# Patient Record
Sex: MALE | Race: WHITE | Employment: FULL TIME | URBAN - METROPOLITAN AREA
[De-identification: names, ages, dates, MRNs, and addresses within clinical notes are randomized per-mention and may not be internally consistent; named-entity substitution may affect disease eponyms.]

---

## 2018-08-06 ENCOUNTER — TRANSFERRED RECORDS (OUTPATIENT)
Dept: HEALTH INFORMATION MANAGEMENT | Facility: CLINIC | Age: 58
End: 2018-08-06

## 2018-09-11 ENCOUNTER — TRANSFERRED RECORDS (OUTPATIENT)
Dept: HEALTH INFORMATION MANAGEMENT | Facility: CLINIC | Age: 58
End: 2018-09-11

## 2018-09-11 LAB
ALT SERPL-CCNC: 32 IU/L (ref 0–44)
AST SERPL-CCNC: 29 IU/L (ref 0–40)
CHOLEST SERPL-MCNC: 286 MG/DL (ref 100–199)
CREAT SERPL-MCNC: 1.07 MG/DL (ref 0.76–1.27)
GFR SERPL CREATININE-BSD FRML MDRD: 76 ML/MIN/1.73M2
GLUCOSE SERPL-MCNC: 98 MG/DL (ref 65–99)
HBA1C MFR BLD: 5.1 % (ref 4.8–5.6)
HDLC SERPL-MCNC: 76 MG/DL
LDLC SERPL CALC-MCNC: 196 MG/DL (ref 0–99)
POTASSIUM SERPL-SCNC: 5 MMOL/L (ref 3.5–5.2)
TRIGL SERPL-MCNC: 71 MG/DL (ref 0–149)
TSH SERPL-ACNC: 1.81 UIU/ML (ref 0.45–4.5)

## 2018-09-20 ENCOUNTER — TRANSFERRED RECORDS (OUTPATIENT)
Dept: HEALTH INFORMATION MANAGEMENT | Facility: CLINIC | Age: 58
End: 2018-09-20

## 2020-03-11 ENCOUNTER — MEDICAL CORRESPONDENCE (OUTPATIENT)
Dept: HEALTH INFORMATION MANAGEMENT | Facility: CLINIC | Age: 60
End: 2020-03-11

## 2020-07-14 ENCOUNTER — TELEPHONE (OUTPATIENT)
Dept: FAMILY MEDICINE | Facility: CLINIC | Age: 60
End: 2020-07-14

## 2020-07-15 NOTE — TELEPHONE ENCOUNTER
Reason for call:  Other   Patient called regarding (reason for call): Pt said would like to discuss getting 'general medication' before physical appt, would not elaborate as to what medication; please advise  Additional comments:     Phone number to reach patient:  Home number on file [phone] (home)    Best Time:  After 4pm     Can we leave a detailed message on this number?  NO    Travel screening: Not Applicable

## 2020-07-22 NOTE — TELEPHONE ENCOUNTER
Spoke with patient     States he will address his questions at upcoming phone visit - did not have any needs or concerns before then    Nicole VALERA RN

## 2020-08-14 ENCOUNTER — OFFICE VISIT (OUTPATIENT)
Dept: FAMILY MEDICINE | Facility: CLINIC | Age: 60
End: 2020-08-14
Payer: COMMERCIAL

## 2020-08-14 VITALS
SYSTOLIC BLOOD PRESSURE: 128 MMHG | TEMPERATURE: 96.8 F | HEIGHT: 69 IN | WEIGHT: 191.8 LBS | BODY MASS INDEX: 28.41 KG/M2 | HEART RATE: 62 BPM | OXYGEN SATURATION: 96 % | DIASTOLIC BLOOD PRESSURE: 87 MMHG

## 2020-08-14 DIAGNOSIS — R23.8 PAPULE: ICD-10-CM

## 2020-08-14 DIAGNOSIS — Z00.00 ENCOUNTER FOR PREVENTIVE HEALTH EXAMINATION: Primary | ICD-10-CM

## 2020-08-14 DIAGNOSIS — E78.00 ELEVATED CHOLESTEROL: ICD-10-CM

## 2020-08-14 DIAGNOSIS — N52.9 ERECTILE DYSFUNCTION, UNSPECIFIED ERECTILE DYSFUNCTION TYPE: ICD-10-CM

## 2020-08-14 LAB
BASOPHILS # BLD AUTO: 0 10E9/L (ref 0–0.2)
BASOPHILS NFR BLD AUTO: 0.4 %
DIFFERENTIAL METHOD BLD: ABNORMAL
EOSINOPHIL # BLD AUTO: 0.1 10E9/L (ref 0–0.7)
EOSINOPHIL NFR BLD AUTO: 1.3 %
ERYTHROCYTE [DISTWIDTH] IN BLOOD BY AUTOMATED COUNT: 11.9 % (ref 10–15)
HCT VFR BLD AUTO: 40.1 % (ref 40–53)
HGB BLD-MCNC: 14.7 G/DL (ref 13.3–17.7)
LYMPHOCYTES # BLD AUTO: 2 10E9/L (ref 0.8–5.3)
LYMPHOCYTES NFR BLD AUTO: 36.5 %
MCH RBC QN AUTO: 33.6 PG (ref 26.5–33)
MCHC RBC AUTO-ENTMCNC: 36.7 G/DL (ref 31.5–36.5)
MCV RBC AUTO: 92 FL (ref 78–100)
MONOCYTES # BLD AUTO: 0.5 10E9/L (ref 0–1.3)
MONOCYTES NFR BLD AUTO: 8.9 %
NEUTROPHILS # BLD AUTO: 2.8 10E9/L (ref 1.6–8.3)
NEUTROPHILS NFR BLD AUTO: 52.9 %
PLATELET # BLD AUTO: 204 10E9/L (ref 150–450)
RBC # BLD AUTO: 4.37 10E12/L (ref 4.4–5.9)
WBC # BLD AUTO: 5.4 10E9/L (ref 4–11)

## 2020-08-14 PROCEDURE — 80061 LIPID PANEL: CPT | Performed by: INTERNAL MEDICINE

## 2020-08-14 PROCEDURE — G0103 PSA SCREENING: HCPCS | Performed by: INTERNAL MEDICINE

## 2020-08-14 PROCEDURE — 99386 PREV VISIT NEW AGE 40-64: CPT | Performed by: INTERNAL MEDICINE

## 2020-08-14 PROCEDURE — 80050 GENERAL HEALTH PANEL: CPT | Performed by: INTERNAL MEDICINE

## 2020-08-14 PROCEDURE — 99213 OFFICE O/P EST LOW 20 MIN: CPT | Mod: 25 | Performed by: INTERNAL MEDICINE

## 2020-08-14 PROCEDURE — 36415 COLL VENOUS BLD VENIPUNCTURE: CPT | Performed by: INTERNAL MEDICINE

## 2020-08-14 RX ORDER — SILDENAFIL CITRATE 20 MG/1
20 TABLET ORAL PRN
Qty: 10 TABLET | Refills: 3 | Status: SHIPPED | OUTPATIENT
Start: 2020-08-14 | End: 2021-01-13

## 2020-08-14 ASSESSMENT — MIFFLIN-ST. JEOR: SCORE: 1666.25

## 2020-08-14 NOTE — PROGRESS NOTES
SUBJECTIVE:   CC: Ambrosio David is an 60 year old male who presents for preventative health visit.  Patient presents for physical examination.  He is moving to [place] from [place].  Patient works for [employer].    Overall he is doing well.  Continues to be quite active.  He does not smoke.  He exercises regularly.  He rides bike and runs.  His most recent bike ride was 60 miles.    Patient does have some issues of erectile dysfunction.  Patient states that when he is under periods of stress he has more issues with erectile dysfunction.  Does wake up with erections from time to time.  He does admit that his urinary flow is diminished.  He has nocturia x1 sometimes 2    Denies chest pain shortness of breath bowel or urinary symptoms.  He does have sun damaged skin he has had premalignant masses removed historically and has 2 new lesions that he would like evaluated by dermatology.    He has a family history of colonic cancer with his mother afflicted.  He is uncertain as to when his next colonoscopy is scheduled for but he currently is on a 5-year interval.  Healthy Habits:    Getting at least 3 servings of Calcium per day:  Yes    Bi-annual eye exam:  Yes    Dental care twice a year:  NO    Sleep apnea or symptoms of sleep apnea:  None    Diet:  Regular (no restrictions)    Frequency of exercise:  2-3 days/week    Duration of exercise:  30-45 minutes    Taking medications regularly:  Not Applicable    Barriers to taking medications:  Not applicable    Medication side effects:  Not applicable    PHQ-2 Total Score:    Additional concerns today:  No        Today's PHQ-2 Score: No flowsheet data found.    Abuse: Current or Past(Physical, Sexual or Emotional)- No  Do you feel safe in your environment? Yes    Have you ever done Advance Care Planning? (For example, a Health Directive, POLST, or a discussion with a medical provider or your loved ones about your wishes): No, advance care planning information  "given to patient to review.  Patient declined advance care planning discussion at this time.    Social History     Tobacco Use     Smoking status: Never Smoker     Smokeless tobacco: Never Used   Substance Use Topics     Alcohol use: Yes     If you drink alcohol do you typically have >3 drinks per day or >7 drinks per week? No    No flowsheet data found.    Last PSA: No results found for: PSA    Reviewed orders with patient. Reviewed health maintenance and updated orders accordingly - Yes  None other than actinic keratoses and mild erectile dysfunction    Reviewed and updated as needed this visit by clinical staff  Tobacco  Allergies  Meds  Soc Hx        Reviewed and updated as needed this visit by Provider            Review of Systems  Negative for 10 systems with the exception of 2 skin lesions, erectile dysfunction, and stressors.    OBJECTIVE:   /87 (BP Location: Left arm, Patient Position: Sitting, Cuff Size: Adult Regular)   Pulse 62   Temp 96.8  F (36  C) (Temporal)   Ht 1.754 m (5' 9.06\")   Wt 87 kg (191 lb 12.8 oz)   SpO2 96%   BMI 28.28 kg/m      Physical Exam  Alert cooperative patient in no apparent distress extraocular movements intact pupils equally round oropharynx nonerythematous neck is supple no carotid bruits are noted no thyromegaly  Lungs are clear without wheezes rales rhonchi  Heart S1-S2 regular rate and rhythm without murmur rub or gallop  Skin multiple solar lentigos the patient has an anterior chest papule 3 x 3 mm in size with a rough surface he has multiple seborrheic keratoses in the upper trunk and lower extremities    Abdomen soft nontender good bowel sounds no paraspinal megaly  No testicular masses or lesions  Prostate examination reveals a firm nonnodular prostate.    Extremities no edema 2+ posterior tibial pulses are present  Affect and mood are appropriate    Diagnostic Test Results:  Labs reviewed in Epic    ASSESSMENT/PLAN:   This is a pleasant 60-year-old male " "who appears to be doing well.  He seems very fit and is exercising on a regular basis.    His weight is appropriate for his build.  He is cognizant of the need to eat well and be active.  He exercises more than 1 hour daily.    He has erectile dysfunction which is worsened with stressors.  In the past sildenafil has been helpful in these instances.  He exercises to reduce stress and he mentions that his wife is a good outlet for his stressors.  Their relationship seems good in this regard    As would regard the skin abnormality this may be of verruca however somewhat built up and it is excoriated it could represent basal cell carcinoma and I will refer him to dermatology for biopsy.    We need to obtain his records from [place] as well as [place] to determine the timeframe of his colorectal cancer screening    COUNSELING:   Patient is doing well.  His weight is very close to prefer to wait for his build.    Estimated body mass index is 28.28 kg/m  as calculated from the following:    Height as of this encounter: 1.754 m (5' 9.06\").    Weight as of this encounter: 87 kg (191 lb 12.8 oz).          reports that he has never smoked. He has never used smokeless tobacco.      Counseling Resources:  ATP IV Guidelines  Pooled Cohorts Equation Calculator  FRAX Risk Assessment  ICSI Preventive Guidelines  Dietary Guidelines for Americans, 2010  USDA's MyPlate  ASA Prophylaxis  Lung CA Screening    Morris Alexander MD  [facility]  "

## 2020-08-14 NOTE — PATIENT INSTRUCTIONS
Please inform me of the dosage of your sildenafil.  I be happy to refill it for you    We need to obtain your medical records so we know when you are due for your 5-year colonoscopy.    Labs will be obtained today including cholesterol and thyroid function.    A PSA will be assessed today.    I recommend a men's vitamin with selenium and lycopene for prostate health.    Best regards  Morris

## 2020-08-14 NOTE — LETTER
August 17, 2020      Ambrosio David  [address] [address]        Dear ,    We are writing to inform you of your test results.    Please reduce your saturated fat intake. We should recheck these levels in 3 months with a fasting lipid panel.     Resulted Orders   Comprehensive metabolic panel (BMP + Alb, Alk Phos, ALT, AST, Total. Bili, TP)   Result Value Ref Range    Sodium 134 133 - 144 mmol/L    Potassium 4.5 3.4 - 5.3 mmol/L    Chloride 102 94 - 109 mmol/L    Carbon Dioxide 27 20 - 32 mmol/L    Anion Gap 5 3 - 14 mmol/L    Glucose 96 70 - 99 mg/dL    Urea Nitrogen 13 7 - 30 mg/dL    Creatinine 1.05 0.66 - 1.25 mg/dL    GFR Estimate 76 >60 mL/min/[1.73_m2]      Comment:      Non  GFR Calc  Starting 12/18/2018, serum creatinine based estimated GFR (eGFR) will be   calculated using the Chronic Kidney Disease Epidemiology Collaboration   (CKD-EPI) equation.      GFR Estimate If Black 88 >60 mL/min/[1.73_m2]      Comment:       GFR Calc  Starting 12/18/2018, serum creatinine based estimated GFR (eGFR) will be   calculated using the Chronic Kidney Disease Epidemiology Collaboration   (CKD-EPI) equation.      Calcium 9.3 8.5 - 10.1 mg/dL    Bilirubin Total 0.8 0.2 - 1.3 mg/dL    Albumin 4.0 3.4 - 5.0 g/dL    Protein Total 7.6 6.8 - 8.8 g/dL    Alkaline Phosphatase 57 40 - 150 U/L    ALT 28 0 - 70 U/L    AST 18 0 - 45 U/L   CBC with platelets and differential   Result Value Ref Range    WBC 5.4 4.0 - 11.0 10e9/L    RBC Count 4.37 (L) 4.4 - 5.9 10e12/L    Hemoglobin 14.7 13.3 - 17.7 g/dL    Hematocrit 40.1 40.0 - 53.0 %    MCV 92 78 - 100 fl    MCH 33.6 (H) 26.5 - 33.0 pg    MCHC 36.7 (H) 31.5 - 36.5 g/dL    RDW 11.9 10.0 - 15.0 %    Platelet Count 204 150 - 450 10e9/L    % Neutrophils 52.9 %    % Lymphocytes 36.5 %    % Monocytes 8.9 %    % Eosinophils 1.3 %    % Basophils 0.4 %    Absolute Neutrophil 2.8 1.6 - 8.3 10e9/L    Absolute Lymphocytes 2.0 0.8 - 5.3 10e9/L     Absolute Monocytes 0.5 0.0 - 1.3 10e9/L    Absolute Eosinophils 0.1 0.0 - 0.7 10e9/L    Absolute Basophils 0.0 0.0 - 0.2 10e9/L    Diff Method Automated Method    Lipid panel reflex to direct LDL Fasting   Result Value Ref Range    Cholesterol 262 (H) <200 mg/dL      Comment:      Desirable:       <200 mg/dl    Triglycerides 170 (H) <150 mg/dL      Comment:      Borderline high:  150-199 mg/dl  High:             200-499 mg/dl  Very high:       >499 mg/dl      HDL Cholesterol 67 >39 mg/dL    LDL Cholesterol Calculated 161 (H) <100 mg/dL      Comment:      Above desirable:  100-129 mg/dl  Borderline High:  130-159 mg/dL  High:             160-189 mg/dL  Very high:       >189 mg/dl      Non HDL Cholesterol 195 (H) <130 mg/dL      Comment:      Above Desirable:  130-159 mg/dl  Borderline high:  160-189 mg/dl  High:             190-219 mg/dl  Very high:       >219 mg/dl     PSA, screen   Result Value Ref Range    PSA 1.36 0 - 4 ug/L      Comment:      Assay Method:  Chemiluminescence using Siemens Vista analyzer   TSH with free T4 reflex   Result Value Ref Range    TSH 1.74 0.40 - 4.00 mU/L       If you have any questions or concerns, please call the clinic at the number listed above.       Regards,     Morris

## 2020-08-15 LAB
ALBUMIN SERPL-MCNC: 4 G/DL (ref 3.4–5)
ALP SERPL-CCNC: 57 U/L (ref 40–150)
ALT SERPL W P-5'-P-CCNC: 28 U/L (ref 0–70)
ANION GAP SERPL CALCULATED.3IONS-SCNC: 5 MMOL/L (ref 3–14)
AST SERPL W P-5'-P-CCNC: 18 U/L (ref 0–45)
BILIRUB SERPL-MCNC: 0.8 MG/DL (ref 0.2–1.3)
BUN SERPL-MCNC: 13 MG/DL (ref 7–30)
CALCIUM SERPL-MCNC: 9.3 MG/DL (ref 8.5–10.1)
CHLORIDE SERPL-SCNC: 102 MMOL/L (ref 94–109)
CHOLEST SERPL-MCNC: 262 MG/DL
CO2 SERPL-SCNC: 27 MMOL/L (ref 20–32)
CREAT SERPL-MCNC: 1.05 MG/DL (ref 0.66–1.25)
GFR SERPL CREATININE-BSD FRML MDRD: 76 ML/MIN/{1.73_M2}
GLUCOSE SERPL-MCNC: 96 MG/DL (ref 70–99)
HDLC SERPL-MCNC: 67 MG/DL
LDLC SERPL CALC-MCNC: 161 MG/DL
NONHDLC SERPL-MCNC: 195 MG/DL
POTASSIUM SERPL-SCNC: 4.5 MMOL/L (ref 3.4–5.3)
PROT SERPL-MCNC: 7.6 G/DL (ref 6.8–8.8)
PSA SERPL-ACNC: 1.36 UG/L (ref 0–4)
SODIUM SERPL-SCNC: 134 MMOL/L (ref 133–144)
TRIGL SERPL-MCNC: 170 MG/DL
TSH SERPL DL<=0.005 MIU/L-ACNC: 1.74 MU/L (ref 0.4–4)

## 2020-09-11 ENCOUNTER — TRANSFERRED RECORDS (OUTPATIENT)
Dept: HEALTH INFORMATION MANAGEMENT | Facility: CLINIC | Age: 60
End: 2020-09-11

## 2021-02-16 ENCOUNTER — OFFICE VISIT (OUTPATIENT)
Dept: FAMILY MEDICINE | Facility: CLINIC | Age: 61
End: 2021-02-16
Payer: COMMERCIAL

## 2021-02-16 VITALS
OXYGEN SATURATION: 98 % | DIASTOLIC BLOOD PRESSURE: 88 MMHG | HEART RATE: 55 BPM | SYSTOLIC BLOOD PRESSURE: 132 MMHG | HEIGHT: 69 IN | TEMPERATURE: 97.5 F | WEIGHT: 205.5 LBS | BODY MASS INDEX: 30.44 KG/M2

## 2021-02-16 DIAGNOSIS — E78.5 HYPERLIPIDEMIA LDL GOAL <130: ICD-10-CM

## 2021-02-16 DIAGNOSIS — G47.00 INSOMNIA, UNSPECIFIED TYPE: Primary | ICD-10-CM

## 2021-02-16 PROCEDURE — 99213 OFFICE O/P EST LOW 20 MIN: CPT | Performed by: INTERNAL MEDICINE

## 2021-02-16 RX ORDER — TRAZODONE HYDROCHLORIDE 50 MG/1
50 TABLET, FILM COATED ORAL AT BEDTIME
Qty: 14 TABLET | Refills: 0 | Status: SHIPPED | OUTPATIENT
Start: 2021-02-16 | End: 2021-03-08

## 2021-02-16 SDOH — HEALTH STABILITY: MENTAL HEALTH: HOW MANY STANDARD DRINKS CONTAINING ALCOHOL DO YOU HAVE ON A TYPICAL DAY?: NOT ASKED

## 2021-02-16 SDOH — HEALTH STABILITY: MENTAL HEALTH: HOW OFTEN DO YOU HAVE A DRINK CONTAINING ALCOHOL?: NOT ASKED

## 2021-02-16 SDOH — HEALTH STABILITY: MENTAL HEALTH: HOW OFTEN DO YOU HAVE 6 OR MORE DRINKS ON ONE OCCASION?: NOT ASKED

## 2021-02-16 ASSESSMENT — MIFFLIN-ST. JEOR: SCORE: 1727.52

## 2021-02-16 NOTE — PROGRESS NOTES
"    Assessment & Plan     Insomnia, unspecified type  I suspect that this is the process which is causing his fatigue and diminished libido.  If correction of his sleep pattern does not improve his other symptoms we will investigate hormonal abnormalities such as hypogonad state  - traZODone (DESYREL) 50 MG tablet; Take 1 tablet (50 mg) by mouth At Bedtime    Hyperlipidemia LDL goal <130  Lipids been elevated historically he will work on his diet we will reassess his cholesterol level in 3 months time  - Lipid panel reflex to direct LDL Fasting; Future      No LOS data to display         BMI:   Estimated body mass index is 30.35 kg/m  as calculated from the following:    Height as of this encounter: 1.753 m (5' 9\").    Weight as of this encounter: 93.2 kg (205 lb 8 oz).   Patient working on diet.  He does get a fair amount of exercise at work.    See Patient Instructions    Return in about 6 months (around 8/16/2021) for Routine preventive.    Morris Alexander MD  [facility] ADALI Valente is a 60 year old who presents for the following health issues     HPI   Patient presents with history of diminished libido.  He also is having issues with fatigue.  Other factors that play include stress at work and poor sleep.  Alcohol utilization also has increased slightly.    We discussed that improvement in sleep.  This may help regarding his libido.  I evaluate his testosterone I suspect it will be on the lower end of the normal range given his history.    You 1 years of age.  No prostate symptomatology present time      New Patient/Transfer of Care  New Patient/Transfer of Care    Review of Systems   Constitutional, HEENT, cardiovascular, pulmonary, gi and gu systems are negative, except as otherwise noted.      Objective    /88 (BP Location: Right arm, Cuff Size: Adult Large)   Pulse 55   Temp 97.5  F (36.4  C) (Oral)   Ht 1.753 m (5' 9\")   Wt 93.2 kg (205 lb 8 oz)   SpO2 98%   BMI " 30.35 kg/m    Body mass index is 30.35 kg/m .  Physical Exam   Alert cooperative patient in no apparent distress  Heart regular rate and rhythm  Insight and judgment intact  Affect and mood appear appropriate.  Stressors at work as above.  He does not have hopelessness or helplessness     Office Visit on 08/14/2020   Component Date Value Ref Range Status     Sodium 08/14/2020 134  133 - 144 mmol/L Final     Potassium 08/14/2020 4.5  3.4 - 5.3 mmol/L Final     Chloride 08/14/2020 102  94 - 109 mmol/L Final     Carbon Dioxide 08/14/2020 27  20 - 32 mmol/L Final     Anion Gap 08/14/2020 5  3 - 14 mmol/L Final     Glucose 08/14/2020 96  70 - 99 mg/dL Final     Urea Nitrogen 08/14/2020 13  7 - 30 mg/dL Final     Creatinine 08/14/2020 1.05  0.66 - 1.25 mg/dL Final     GFR Estimate 08/14/2020 76  >60 mL/min/[1.73_m2] Final    Comment: Non  GFR Calc  Starting 12/18/2018, serum creatinine based estimated GFR (eGFR) will be   calculated using the Chronic Kidney Disease Epidemiology Collaboration   (CKD-EPI) equation.       GFR Estimate If Black 08/14/2020 88  >60 mL/min/[1.73_m2] Final    Comment:  GFR Calc  Starting 12/18/2018, serum creatinine based estimated GFR (eGFR) will be   calculated using the Chronic Kidney Disease Epidemiology Collaboration   (CKD-EPI) equation.       Calcium 08/14/2020 9.3  8.5 - 10.1 mg/dL Final     Bilirubin Total 08/14/2020 0.8  0.2 - 1.3 mg/dL Final     Albumin 08/14/2020 4.0  3.4 - 5.0 g/dL Final     Protein Total 08/14/2020 7.6  6.8 - 8.8 g/dL Final     Alkaline Phosphatase 08/14/2020 57  40 - 150 U/L Final     ALT 08/14/2020 28  0 - 70 U/L Final     AST 08/14/2020 18  0 - 45 U/L Final     WBC 08/14/2020 5.4  4.0 - 11.0 10e9/L Final     RBC Count 08/14/2020 4.37* 4.4 - 5.9 10e12/L Final     Hemoglobin 08/14/2020 14.7  13.3 - 17.7 g/dL Final     Hematocrit 08/14/2020 40.1  40.0 - 53.0 % Final     MCV 08/14/2020 92  78 - 100 fl Final     MCH 08/14/2020 33.6*  26.5 - 33.0 pg Final     MCHC 08/14/2020 36.7* 31.5 - 36.5 g/dL Final     RDW 08/14/2020 11.9  10.0 - 15.0 % Final     Platelet Count 08/14/2020 204  150 - 450 10e9/L Final     % Neutrophils 08/14/2020 52.9  % Final     % Lymphocytes 08/14/2020 36.5  % Final     % Monocytes 08/14/2020 8.9  % Final     % Eosinophils 08/14/2020 1.3  % Final     % Basophils 08/14/2020 0.4  % Final     Absolute Neutrophil 08/14/2020 2.8  1.6 - 8.3 10e9/L Final     Absolute Lymphocytes 08/14/2020 2.0  0.8 - 5.3 10e9/L Final     Absolute Monocytes 08/14/2020 0.5  0.0 - 1.3 10e9/L Final     Absolute Eosinophils 08/14/2020 0.1  0.0 - 0.7 10e9/L Final     Absolute Basophils 08/14/2020 0.0  0.0 - 0.2 10e9/L Final     Diff Method 08/14/2020 Automated Method   Final     Cholesterol 08/14/2020 262* <200 mg/dL Final    Desirable:       <200 mg/dl     Triglycerides 08/14/2020 170* <150 mg/dL Final    Comment: Borderline high:  150-199 mg/dl  High:             200-499 mg/dl  Very high:       >499 mg/dl       HDL Cholesterol 08/14/2020 67  >39 mg/dL Final     LDL Cholesterol Calculated 08/14/2020 161* <100 mg/dL Final    Comment: Above desirable:  100-129 mg/dl  Borderline High:  130-159 mg/dL  High:             160-189 mg/dL  Very high:       >189 mg/dl       Non HDL Cholesterol 08/14/2020 195* <130 mg/dL Final    Comment: Above Desirable:  130-159 mg/dl  Borderline high:  160-189 mg/dl  High:             190-219 mg/dl  Very high:       >219 mg/dl       PSA 08/14/2020 1.36  0 - 4 ug/L Final    Assay Method:  Chemiluminescence using Siemens Vista analyzer     TSH 08/14/2020 1.74  0.40 - 4.00 mU/L Final

## 2021-03-05 DIAGNOSIS — G47.00 INSOMNIA, UNSPECIFIED TYPE: ICD-10-CM

## 2021-03-07 ENCOUNTER — HEALTH MAINTENANCE LETTER (OUTPATIENT)
Age: 61
End: 2021-03-07

## 2021-03-08 RX ORDER — TRAZODONE HYDROCHLORIDE 50 MG/1
TABLET, FILM COATED ORAL
Qty: 14 TABLET | Refills: 0 | Status: SHIPPED | OUTPATIENT
Start: 2021-03-08 | End: 2021-05-20

## 2021-04-20 ENCOUNTER — OFFICE VISIT (OUTPATIENT)
Dept: DERMATOLOGY | Facility: CLINIC | Age: 61
End: 2021-04-20
Attending: INTERNAL MEDICINE
Payer: COMMERCIAL

## 2021-04-20 VITALS — SYSTOLIC BLOOD PRESSURE: 133 MMHG | DIASTOLIC BLOOD PRESSURE: 86 MMHG | OXYGEN SATURATION: 96 % | HEART RATE: 57 BPM

## 2021-04-20 DIAGNOSIS — D22.9 NEVUS: ICD-10-CM

## 2021-04-20 DIAGNOSIS — D18.01 ANGIOMA OF SKIN: ICD-10-CM

## 2021-04-20 DIAGNOSIS — L82.0 INFLAMED SEBORRHEIC KERATOSIS: ICD-10-CM

## 2021-04-20 DIAGNOSIS — L81.4 LENTIGO: ICD-10-CM

## 2021-04-20 DIAGNOSIS — L91.8 INFLAMED ACROCHORDON: ICD-10-CM

## 2021-04-20 DIAGNOSIS — L82.1 SEBORRHEIC KERATOSIS: ICD-10-CM

## 2021-04-20 DIAGNOSIS — D48.5 NEOPLASM OF UNCERTAIN BEHAVIOR OF SKIN: Primary | ICD-10-CM

## 2021-04-20 PROCEDURE — 17110 DESTRUCTION B9 LES UP TO 14: CPT | Performed by: PHYSICIAN ASSISTANT

## 2021-04-20 PROCEDURE — 99203 OFFICE O/P NEW LOW 30 MIN: CPT | Mod: 25 | Performed by: PHYSICIAN ASSISTANT

## 2021-04-20 PROCEDURE — 11102 TANGNTL BX SKIN SINGLE LES: CPT | Mod: 59 | Performed by: PHYSICIAN ASSISTANT

## 2021-04-20 PROCEDURE — 88305 TISSUE EXAM BY PATHOLOGIST: CPT | Performed by: DERMATOLOGY

## 2021-04-20 PROCEDURE — 11200 RMVL SKIN TAGS UP TO&INC 15: CPT | Mod: 59 | Performed by: PHYSICIAN ASSISTANT

## 2021-04-20 NOTE — PROGRESS NOTES
HPI:   Chief complaints: Ambrosio David is a pleasant 61 year old male who presents for Full skin cancer screening to rule out skin cancer   Last Skin Exam: about 2 years ago      1st Baseline: no  Personal HX of Skin Cancer: no   Personal HX of Malignant Melanoma: no   Family HX of Skin Cancer / Malignant Melanoma: no  Personal HX of Atypical Moles:   no  Risk factors: history of sun exposure and burns; limited sunscreen use  New / Changing lesions:no  Social History: from [place] originally and has lived all over the . Has 3 children who live in the [place] area. He works for [employer]   On review of systems, there are no further skin complaints, patient is feeling otherwise well.  See patient intake sheet.  ROS of the following were done and are negative: Constitutional, Eyes, Ears, Nose,   Mouth, Throat, Cardiovascular, Respiratory, GI, Genitourinary, Musculoskeletal,   Psychiatric, Endocrine, Allergic/Immunologic.    PHYSICAL EXAM:   /86   Pulse 57   SpO2 96%   Skin exam performed as follows: Type 2 skin. Mood appropriate  Alert and Oriented X 3. Well developed, well nourished in no distress.  General appearance: Normal  Head including face: Normal  Eyes: conjunctiva and lids: Normal  Mouth: Lips, teeth, gums: Normal  Neck: Normal  Chest-breast/axillae: Normal  Back: Normal  Spleen and liver: Normal  Cardiovascular: Exam of peripheral vascular system by observation for swelling, varicosities, edema: Normal  Genitalia: groin, buttocks: Normal  Extremities: digits/nails (clubbing): Normal  Eccrine and Apocrine glands: Normal  Right upper extremity: Normal  Left upper extremity: Normal  Right lower extremity: Normal  Left lower extremity: Normal  Skin: Scalp and body hair: See below    Pt deferred exam of breasts, groin, buttocks: No    Other physical findings:  1. Multiple pigmented macules on extremities and trunk  2. Multiple pigmented macules on face, trunk and extremities  3. Multiple vascular  papules on trunk, arms and legs  4. Multiple scattered keratotic plaques  5. 8 mm pink papule on the right mid back  6. Inflamed keratotic papule on the mid chest x 1, right popliteal fossa x 1  7. Inflamed acrochordon on the right upper flank x 1, left upper flank x 1  8. patchy hypopigmentation on the scalp     Except as noted above, no other signs of skin cancer or melanoma.     ASSESSMENT/PLAN:   Benign Full skin cancer screening today. . Patient with history of none  Advised on monthly self exams and 1 year  Patient Education: Appropriate brochures given.    1. Multiple benign appearing nevi on arms, legs and trunk. Discussed ABCDEs of melanoma and sunscreen.   2. Multiple lentigos on arms, legs and trunk. Advised benign, no treatment needed.  3. Multiple scattered angiomas. Advised benign, no treatment needed.   4. Seborrheic keratosis on arms, legs and trunk. Advised benign, no treatment needed.  5. R/o BCC on the right mid back. Shave biopsy performed.  Area cleaned and anesthetized with 1% lidocaine with epinephrine.  Dermablade used to remove the lesion and sent to pathology. Bleeding was cauterized. Pt tolerated procedure well with no complications.   6. Inflamed seborrheic keratosis on the mid chest x 1, right popliteal fossa x 1. As physically tender cryosurgery performed. Advised on post op care.   7. Inflamed acrochordon on the right upper flank x 1, left upper flank x 1. Cryosurgery performed. Advised on blistering and post op care.   8. Leukoderma on the scalp post severe sunburn about 6 months ago. Discussed that pigment could possibly return; no good treatments for this.             Follow-up: yearly FSE/PRN sooner    1.) Patient was asked about new and changing moles. YES  2.) Patient received a complete physical skin examination: YES  3.) Patient was counseled to perform a monthly self skin examination: YES  Scribed By: Jahaira Wade MS, PABRANDT

## 2021-04-20 NOTE — PATIENT INSTRUCTIONS
Wound Care Instructions     FOR SUPERFICIAL WOUNDS     [facility] [phone]                 AFTER 24 HOURS YOU SHOULD REMOVE THE BANDAGE AND BEGIN DAILY DRESSING CHANGES AS FOLLOWS:     1) Remove Dressing.     2) Clean and dry the area with tap water using a Q-tip or sterile gauze pad.     3) Apply Vaseline, Aquaphor, Polysporin ointment or Bacitracin ointment over entire wound.  Do NOT use Neosporin ointment.     4) Cover the wound with a band-aid, or a sterile non-stick gauze pad and micropore paper tape    REPEAT THESE INSTRUCTIONS AT LEAST ONCE A DAY UNTIL THE WOUND HAS COMPLETELY HEALED.    It is an old wives tale that a wound heals better when it is exposed to air and allowed to dry out. The wound will heal faster with a better cosmetic result if it is kept moist with ointment and covered with a bandage.    **Do not let the wound dry out.**    Supplies Needed:      *Cotton tipped applicators (Q-tips)    *Vaseline, Aquaphor, Polysporin or Bacitracin Ointment (NOT NEOSPORIN)    *Band-aids or non-stick gauze pads and micropore paper tape.      PATIENT INFORMATION:    During the healing process you will notice a number of changes. All wounds develop a small halo of redness surrounding the wound.  This means healing is occurring. Severe itching with extensive redness usually indicates sensitivity to the ointment or bandage tape used to dress the wound.  You should call our office if this develops.      Swelling  and/or discoloration around your surgical site is common, particularly when performed around the eye.    All wounds normally drain.  The larger the wound the more drainage there will be.  After 7-10 days, you will notice the wound beginning to shrink and new skin will begin to grow.  The wound is healed when you can see skin has formed over the entire area.  A healed wound has a healthy, shiny look to the surface and is red to dark pink in color to normalize.  Wounds may take approximately  4-6 weeks to heal.  Larger wounds may take 6-8 weeks.  After the wound is healed you may discontinue dressing changes.    You may experience a sensation of tightness as your wound heals. This is normal and will gradually subside.    Your healed wound may be sensitive to temperature changes. This sensitivity improves with time, but if you re having a lot of discomfort, try to avoid temperature extremes.    Patients frequently experience itching after their wound appears to have healed because of the continue healing under the skin.  Plain Vaseline will help relieve the itching.      POSSIBLE COMPLICATIONS    BLEEDIN. Leave the bandage in place.  2. Use tightly rolled up gauze or a cloth to apply direct pressure over the bandage for 30  minutes.  3. Reapply pressure for an additional 30 minutes if necessary  4. Use additional gauze and tape to maintain pressure once the bleeding has stopped.

## 2021-04-20 NOTE — LETTER
4/20/2021         RE: Ambrosio David  [address] [facility] [address]        Dear Colleague,    Thank you for referring your patient, Ambrosio David, to the [facility]. Please see a copy of my visit note below.    HPI:   Chief complaints: Ambrosio David is a pleasant 61 year old male who presents for Full skin cancer screening to rule out skin cancer   Last Skin Exam: about 2 years ago      1st Baseline: no  Personal HX of Skin Cancer: no   Personal HX of Malignant Melanoma: no   Family HX of Skin Cancer / Malignant Melanoma: no  Personal HX of Atypical Moles:   no  Risk factors: history of sun exposure and burns; limited sunscreen use  New / Changing lesions:no  Social History: from [place] originally and has lived all over the . Has 3 children who live in the [place] area. He works for [employer].   On review of systems, there are no further skin complaints, patient is feeling otherwise well.  See patient intake sheet.  ROS of the following were done and are negative: Constitutional, Eyes, Ears, Nose,   Mouth, Throat, Cardiovascular, Respiratory, GI, Genitourinary, Musculoskeletal,   Psychiatric, Endocrine, Allergic/Immunologic.    PHYSICAL EXAM:   /86   Pulse 57   SpO2 96%   Skin exam performed as follows: Type 2 skin. Mood appropriate  Alert and Oriented X 3. Well developed, well nourished in no distress.  General appearance: Normal  Head including face: Normal  Eyes: conjunctiva and lids: Normal  Mouth: Lips, teeth, gums: Normal  Neck: Normal  Chest-breast/axillae: Normal  Back: Normal  Spleen and liver: Normal  Cardiovascular: Exam of peripheral vascular system by observation for swelling, varicosities, edema: Normal  Genitalia: groin, buttocks: Normal  Extremities: digits/nails (clubbing): Normal  Eccrine and Apocrine glands: Normal  Right upper extremity: Normal  Left upper extremity: Normal  Right lower extremity: Normal  Left lower extremity: Normal  Skin: Scalp  and body hair: See below    Pt deferred exam of breasts, groin, buttocks: No    Other physical findings:  1. Multiple pigmented macules on extremities and trunk  2. Multiple pigmented macules on face, trunk and extremities  3. Multiple vascular papules on trunk, arms and legs  4. Multiple scattered keratotic plaques  5. 8 mm pink papule on the right mid back  6. Inflamed keratotic papule on the mid chest x 1, right popliteal fossa x 1  7. Inflamed acrochordon on the right upper flank x 1, left upper flank x 1  8. patchy hypopigmentation on the scalp     Except as noted above, no other signs of skin cancer or melanoma.     ASSESSMENT/PLAN:   Benign Full skin cancer screening today. . Patient with history of none  Advised on monthly self exams and 1 year  Patient Education: Appropriate brochures given.    1. Multiple benign appearing nevi on arms, legs and trunk. Discussed ABCDEs of melanoma and sunscreen.   2. Multiple lentigos on arms, legs and trunk. Advised benign, no treatment needed.  3. Multiple scattered angiomas. Advised benign, no treatment needed.   4. Seborrheic keratosis on arms, legs and trunk. Advised benign, no treatment needed.  5. R/o BCC on the right mid back. Shave biopsy performed.  Area cleaned and anesthetized with 1% lidocaine with epinephrine.  Dermablade used to remove the lesion and sent to pathology. Bleeding was cauterized. Pt tolerated procedure well with no complications.   6. Inflamed seborrheic keratosis on the mid chest x 1, right popliteal fossa x 1. As physically tender cryosurgery performed. Advised on post op care.   7. Inflamed acrochordon on the right upper flank x 1, left upper flank x 1. Cryosurgery performed. Advised on blistering and post op care.   8. Leukoderma on the scalp post severe sunburn about 6 months ago. Discussed that pigment could possibly return; no good treatments for this.             Follow-up: yearly FSE/PRN sooner    1.) Patient was asked about new and  changing moles. YES  2.) Patient received a complete physical skin examination: YES  3.) Patient was counseled to perform a monthly self skin examination: YES  Scribed By: Jahaira Wade MS, PAPenelopeC          Again, thank you for allowing me to participate in the care of your patient.        Sincerely,        Jahaira Wade PA-C

## 2021-04-25 LAB — COPATH REPORT: NORMAL

## 2021-05-18 DIAGNOSIS — E78.5 HYPERLIPIDEMIA LDL GOAL <130: ICD-10-CM

## 2021-05-18 LAB
CHOLEST SERPL-MCNC: 275 MG/DL
HDLC SERPL-MCNC: 58 MG/DL
LDLC SERPL CALC-MCNC: 183 MG/DL
NONHDLC SERPL-MCNC: 217 MG/DL
TRIGL SERPL-MCNC: 169 MG/DL

## 2021-05-18 PROCEDURE — 80061 LIPID PANEL: CPT | Performed by: INTERNAL MEDICINE

## 2021-05-18 PROCEDURE — 36415 COLL VENOUS BLD VENIPUNCTURE: CPT | Performed by: INTERNAL MEDICINE

## 2021-05-20 DIAGNOSIS — G47.00 INSOMNIA, UNSPECIFIED TYPE: ICD-10-CM

## 2021-05-21 RX ORDER — TRAZODONE HYDROCHLORIDE 50 MG/1
TABLET, FILM COATED ORAL
Qty: 14 TABLET | Refills: 0 | Status: SHIPPED | OUTPATIENT
Start: 2021-05-21

## 2021-05-21 NOTE — TELEPHONE ENCOUNTER
Prescription approved per [facility] Refill Protocol.    Nilsa Sorto RN  [facility]

## 2021-08-31 ENCOUNTER — OFFICE VISIT (OUTPATIENT)
Dept: FAMILY MEDICINE | Facility: CLINIC | Age: 61
End: 2021-08-31
Payer: COMMERCIAL

## 2021-08-31 VITALS
BODY MASS INDEX: 30.36 KG/M2 | HEIGHT: 69 IN | TEMPERATURE: 97.7 F | RESPIRATION RATE: 16 BRPM | DIASTOLIC BLOOD PRESSURE: 90 MMHG | WEIGHT: 205 LBS | OXYGEN SATURATION: 95 % | SYSTOLIC BLOOD PRESSURE: 134 MMHG | HEART RATE: 59 BPM

## 2021-08-31 DIAGNOSIS — Z00.00 ENCOUNTER FOR PREVENTIVE HEALTH EXAMINATION: Primary | ICD-10-CM

## 2021-08-31 DIAGNOSIS — T75.3XXS SEA SICKNESS, SEQUELA: ICD-10-CM

## 2021-08-31 LAB
ALBUMIN SERPL-MCNC: 4 G/DL (ref 3.4–5)
ALP SERPL-CCNC: 71 U/L (ref 40–150)
ALT SERPL W P-5'-P-CCNC: 50 U/L (ref 0–70)
ANION GAP SERPL CALCULATED.3IONS-SCNC: 3 MMOL/L (ref 3–14)
AST SERPL W P-5'-P-CCNC: 28 U/L (ref 0–45)
BASOPHILS # BLD AUTO: 0 10E3/UL (ref 0–0.2)
BASOPHILS NFR BLD AUTO: 1 %
BILIRUB SERPL-MCNC: 0.5 MG/DL (ref 0.2–1.3)
BUN SERPL-MCNC: 8 MG/DL (ref 7–30)
CALCIUM SERPL-MCNC: 9.1 MG/DL (ref 8.5–10.1)
CHLORIDE BLD-SCNC: 102 MMOL/L (ref 94–109)
CHOLEST SERPL-MCNC: 310 MG/DL
CO2 SERPL-SCNC: 26 MMOL/L (ref 20–32)
CREAT SERPL-MCNC: 1.04 MG/DL (ref 0.66–1.25)
EOSINOPHIL # BLD AUTO: 0.1 10E3/UL (ref 0–0.7)
EOSINOPHIL NFR BLD AUTO: 2 %
ERYTHROCYTE [DISTWIDTH] IN BLOOD BY AUTOMATED COUNT: 12 % (ref 10–15)
FASTING STATUS PATIENT QL REPORTED: YES
GFR SERPL CREATININE-BSD FRML MDRD: 77 ML/MIN/1.73M2
GLUCOSE BLD-MCNC: 95 MG/DL (ref 70–99)
HCT VFR BLD AUTO: 42.2 % (ref 40–53)
HDLC SERPL-MCNC: 70 MG/DL
HGB BLD-MCNC: 15.6 G/DL (ref 13.3–17.7)
LDLC SERPL CALC-MCNC: 210 MG/DL
LYMPHOCYTES # BLD AUTO: 1.5 10E3/UL (ref 0.8–5.3)
LYMPHOCYTES NFR BLD AUTO: 37 %
MCH RBC QN AUTO: 33.9 PG (ref 26.5–33)
MCHC RBC AUTO-ENTMCNC: 37 G/DL (ref 31.5–36.5)
MCV RBC AUTO: 92 FL (ref 78–100)
MONOCYTES # BLD AUTO: 0.5 10E3/UL (ref 0–1.3)
MONOCYTES NFR BLD AUTO: 11 %
NEUTROPHILS # BLD AUTO: 2 10E3/UL (ref 1.6–8.3)
NEUTROPHILS NFR BLD AUTO: 50 %
NONHDLC SERPL-MCNC: 240 MG/DL
PLATELET # BLD AUTO: 215 10E3/UL (ref 150–450)
POTASSIUM BLD-SCNC: 4.4 MMOL/L (ref 3.4–5.3)
PROT SERPL-MCNC: 8 G/DL (ref 6.8–8.8)
PSA SERPL-MCNC: 1.61 UG/L (ref 0–4)
RBC # BLD AUTO: 4.6 10E6/UL (ref 4.4–5.9)
SODIUM SERPL-SCNC: 131 MMOL/L (ref 133–144)
T4 FREE SERPL-MCNC: 0.97 NG/DL (ref 0.76–1.46)
TRIGL SERPL-MCNC: 151 MG/DL
TSH SERPL DL<=0.005 MIU/L-ACNC: 4.34 MU/L (ref 0.4–4)
WBC # BLD AUTO: 4.1 10E3/UL (ref 4–11)

## 2021-08-31 PROCEDURE — 80050 GENERAL HEALTH PANEL: CPT | Performed by: INTERNAL MEDICINE

## 2021-08-31 PROCEDURE — 80061 LIPID PANEL: CPT | Performed by: INTERNAL MEDICINE

## 2021-08-31 PROCEDURE — 99396 PREV VISIT EST AGE 40-64: CPT | Performed by: INTERNAL MEDICINE

## 2021-08-31 PROCEDURE — 36415 COLL VENOUS BLD VENIPUNCTURE: CPT | Performed by: INTERNAL MEDICINE

## 2021-08-31 PROCEDURE — 84439 ASSAY OF FREE THYROXINE: CPT | Performed by: INTERNAL MEDICINE

## 2021-08-31 PROCEDURE — G0103 PSA SCREENING: HCPCS | Performed by: INTERNAL MEDICINE

## 2021-08-31 RX ORDER — SCOLOPAMINE TRANSDERMAL SYSTEM 1 MG/1
1 PATCH, EXTENDED RELEASE TRANSDERMAL
Qty: 4 PATCH | Refills: 0 | Status: SHIPPED | OUTPATIENT
Start: 2021-08-31 | End: 2021-11-03

## 2021-08-31 ASSESSMENT — ENCOUNTER SYMPTOMS
SORE THROAT: 0
ARTHRALGIAS: 0
NERVOUS/ANXIOUS: 0
CONSTIPATION: 0
EYE PAIN: 0
DIARRHEA: 0
NAUSEA: 0
PALPITATIONS: 0
HEMATOCHEZIA: 0
DIZZINESS: 0
SHORTNESS OF BREATH: 0
DYSURIA: 0
FREQUENCY: 0
CHILLS: 0
FEVER: 0
WEAKNESS: 0
JOINT SWELLING: 0
MYALGIAS: 0
ABDOMINAL PAIN: 0
HEMATURIA: 0
PARESTHESIAS: 0
HEARTBURN: 0
HEADACHES: 0
COUGH: 0

## 2021-08-31 ASSESSMENT — MIFFLIN-ST. JEOR: SCORE: 1720.25

## 2021-08-31 NOTE — PROGRESS NOTES
SUBJECTIVE:   CC: Ambrosio David is an 61 year old male who presents for preventative health visit.  Patient presents for physical examination.  For the most part he is doing well.  He would like to initiate the new shingles vaccine at this juncture.      Patient has been advised of split billing requirements and indicates understanding: Yes  Healthy Habits:     Getting at least 3 servings of Calcium per day:  Yes    Bi-annual eye exam:  Yes    Dental care twice a year:  Yes    Sleep apnea or symptoms of sleep apnea:  Sleep apnea    Diet:  Regular (no restrictions)    Frequency of exercise:  1 day/week    Duration of exercise:  15-30 minutes    Taking medications regularly:  Yes    Medication side effects:  None    PHQ-2 Total Score: 0    Additional concerns today:  No              Today's PHQ-2 Score:   PHQ-2 ( 1999 Pf[employer]) 8/31/2021   Q1: Little interest or pleasure in doing things 0   Q2: Feeling down, depressed or hopeless 0   PHQ-2 Score 0   Q1: Little interest or pleasure in doing things Not at all   Q2: Feeling down, depressed or hopeless Not at all   PHQ-2 Score 0       Abuse: Current or Past(Physical, Sexual or Emotional)- No  Do you feel safe in your environment? Yes        Social History     Tobacco Use     Smoking status: Never Smoker     Smokeless tobacco: Never Used   Substance Use Topics     Alcohol use: Yes     Comment: 3 drinks per day         Alcohol Use 8/31/2021   Prescreen: >3 drinks/day or >7 drinks/week? Yes   Prescreen: >3 drinks/day or >7 drinks/week? -   AUDIT SCORE  6     AUDIT - Alcohol Use Disorders Identification Test - Reproduced from the World Health Organization Audit 2001 (Second Edition) 8/31/2021   1.  How often do you have a drink containing alcohol? 4 or more times a week   2.  How many drinks containing alcohol do you have on a typical day when you are drinking? 3 or 4   3.  How often do you have five or more drinks on one occasion? Less than monthly   4.  How often during the last  year have you found that you were not able to stop drinking once you had started? Never   5.  How often during the last year have you failed to do what was normally expected of you because of drinking? Never   6.  How often during the last year have you needed a first drink in the morning to get yourself going after a heavy drinking session? Never   7.  How often during the last year have you had a feeling of guilt or remorse after drinking? Never   8.  How often during the last year have you been unable to remember what happened the night before because of your drinking? Never   9.  Have you or someone else been injured because of your drinking? No   10. Has a relative, friend, doctor or other health care worker been concerned about your drinking or suggested you cut down? No   TOTAL SCORE 6       Last PSA:   PSA   Date Value Ref Range Status   08/14/2020 1.36 0 - 4 ug/L Final     Comment:     Assay Method:  Chemiluminescence using Siemens Vista analyzer       Reviewed orders with patient. Reviewed health maintenance and updated orders accordingly - Yes  Lab work is in process    Reviewed and updated as needed this visit by clinical staff                 Reviewed and updated as needed this visit by Provider                No past medical history on file.     Review of Systems  CONSTITUTIONAL: NEGATIVE for fever, chills, change in weight  INTEGUMENTARY/SKIN: NEGATIVE for worrisome rashes, moles or lesions  EYES: NEGATIVE for vision changes or irritation  ENT: NEGATIVE for ear, mouth and throat problems  RESP: NEGATIVE for significant cough or SOB  CV: NEGATIVE for chest pain, palpitations or peripheral edema  GI: NEGATIVE for nausea, abdominal pain, heartburn, or change in bowel habits   male: negative for dysuria, hematuria, decreased urinary stream, erectile dysfunction, urethral discharge  MUSCULOSKELETAL: NEGATIVE for significant arthralgias or myalgia  NEURO: NEGATIVE for weakness, dizziness or  "paresthesias  PSYCHIATRIC: NEGATIVE for changes in mood or affect    OBJECTIVE:   There were no vitals taken for this visit.    Physical Exam  GENERAL: healthy, alert and no distress  EYES: Eyes grossly normal to inspection, PERRL and conjunctivae and sclerae normal  HENT: ear canals and TM's normal, nose and mouth without ulcers or lesions  NECK: no adenopathy, no asymmetry, masses, or scars and thyroid normal to palpation  RESP: lungs clear to auscultation - no rales, rhonchi or wheezes  CV: regular rate and rhythm, normal S1 S2, no S3 or S4, no murmur, click or rub, no peripheral edema and peripheral pulses strong  ABDOMEN: soft, nontender, no hepatosplenomegaly, no masses and bowel sounds normal  MS: no gross musculoskeletal defects noted, no edema  SKIN: no suspicious lesions or rashes  NEURO: Normal strength and tone, mentation intact and speech normal  PSYCH: mentation appears normal, affect normal/bright    Diagnostic Test Results:  Labs reviewed in Epic    ASSESSMENT/PLAN:       ICD-10-CM    1. Encounter for preventive health examination  Z00.00 Comprehensive metabolic panel (BMP + Alb, Alk Phos, ALT, AST, Total. Bili, TP)     CBC with platelets and differential     TSH with free T4 reflex     Lipid panel reflex to direct LDL Fasting     PSA, screen     Comprehensive metabolic panel (BMP + Alb, Alk Phos, ALT, AST, Total. Bili, TP)     CBC with platelets and differential     TSH with free T4 reflex     Lipid panel reflex to direct LDL Fasting     PSA, screen     T4 free   2. Sea sickness, sequela  T75.3XXS scopolamine (TRANSDERM) 1 MG/3DAYS 72 hr patch       Patient has been advised of split billing requirements and indicates understanding: Yes  COUNSELING:   Reviewed preventive health counseling, as reflected in patient instructions    Estimated body mass index is 30.35 kg/m  as calculated from the following:    Height as of 2/16/21: 1.753 m (5' 9\").    Weight as of 2/16/21: 93.2 kg (205 lb 8 oz).   "       He reports that he has never smoked. He has never used smokeless tobacco.      Counseling Resources:  ATP IV Guidelines  Pooled Cohorts Equation Calculator  FRAX Risk Assessment  ICSI Preventive Guidelines  Dietary Guidelines for Americans, 2010  USDA's MyPlate  ASA Prophylaxis  Lung CA Screening    Morris Alexander MD  [facility]

## 2021-09-23 ENCOUNTER — OFFICE VISIT (OUTPATIENT)
Dept: FAMILY MEDICINE | Facility: CLINIC | Age: 61
End: 2021-09-23
Payer: COMMERCIAL

## 2021-09-23 VITALS
HEART RATE: 58 BPM | TEMPERATURE: 96.8 F | SYSTOLIC BLOOD PRESSURE: 140 MMHG | OXYGEN SATURATION: 97 % | WEIGHT: 204 LBS | BODY MASS INDEX: 30.21 KG/M2 | HEIGHT: 69 IN | DIASTOLIC BLOOD PRESSURE: 93 MMHG | RESPIRATION RATE: 16 BRPM

## 2021-09-23 DIAGNOSIS — E03.9 HYPOTHYROIDISM, UNSPECIFIED TYPE: Primary | ICD-10-CM

## 2021-09-23 PROCEDURE — 99213 OFFICE O/P EST LOW 20 MIN: CPT | Performed by: INTERNAL MEDICINE

## 2021-09-23 RX ORDER — LEVOTHYROXINE SODIUM 50 UG/1
50 TABLET ORAL DAILY
Qty: 60 TABLET | Refills: 1 | Status: SHIPPED | OUTPATIENT
Start: 2021-09-23 | End: 2022-01-18

## 2021-09-23 ASSESSMENT — MIFFLIN-ST. JEOR: SCORE: 1715.72

## 2021-09-23 NOTE — PROGRESS NOTES
"    Assessment & Plan     Hypothyroidism, unspecified type  Patient with marginal thyroid levels however clinically quite consistent with hypothyroidism.  Has been having issues with weight gain, fatigue, feeling cold.  At this point we will initiate thyroid replacement and have him return to clinic in 6 weeks to assess his clinical status and recheck his thyroid function    Patient's hyponatremia may relate to elements of dehydration plus minus hypothyroidism/adrenal changes.    - levothyroxine (SYNTHROID/LEVOTHROID) 50 MCG tablet; Take 1 tablet (50 mcg) by mouth daily  - TSH with free T4 reflex; Future       See Patient Instructions    Return in about 6 weeks (around 11/4/2021) for Follow up.    Morris Alexander MD  [facility] ADALI Valente is a 61 year old who presents for the following health issues patient with recent laboratory studies indicating very high cholesterol levels, hypothyroidism, and hyponatremia.    We will have the patient return to clinic in 6 weeks after initiating levothyroxine supplementation.  When he returns we will reassess his lipid panel as well as his electrolytes.  TSH and T4 will be reassessed in 6 weeks.    Current symptomatology consistent with hypothyroidism include weight gain, fatigue, feeling cold, and more tendency towards constipation.    HPI   3 wk F/up  BP    Review of Systems   Constitutional, HEENT, cardiovascular, pulmonary, gi and gu systems are negative, except as otherwise noted.      Objective    BP (!) 140/93 (BP Location: Right arm, Cuff Size: Adult Large)   Pulse 58   Temp 96.8  F (36  C) (Tympanic)   Resp 16   Ht 1.753 m (5' 9\")   Wt 92.5 kg (204 lb)   SpO2 97%   BMI 30.13 kg/m    Body mass index is 30.13 kg/m .  Physical Exam   Skin the hands is dry.  Palms are dry and nondiaphoretic  Affect and mood appear appropriate  Insight and judgment intact    Office Visit on 08/31/2021   Component Date Value Ref Range Status     Sodium " 08/31/2021 131* 133 - 144 mmol/L Final     Potassium 08/31/2021 4.4  3.4 - 5.3 mmol/L Final     Chloride 08/31/2021 102  94 - 109 mmol/L Final     Carbon Dioxide (CO2) 08/31/2021 26  20 - 32 mmol/L Final     Anion Gap 08/31/2021 3  3 - 14 mmol/L Final     Urea Nitrogen 08/31/2021 8  7 - 30 mg/dL Final     Creatinine 08/31/2021 1.04  0.66 - 1.25 mg/dL Final     Calcium 08/31/2021 9.1  8.5 - 10.1 mg/dL Final     Glucose 08/31/2021 95  70 - 99 mg/dL Final     Alkaline Phosphatase 08/31/2021 71  40 - 150 U/L Final     AST 08/31/2021 28  0 - 45 U/L Final     ALT 08/31/2021 50  0 - 70 U/L Final     Protein Total 08/31/2021 8.0  6.8 - 8.8 g/dL Final     Albumin 08/31/2021 4.0  3.4 - 5.0 g/dL Final     Bilirubin Total 08/31/2021 0.5  0.2 - 1.3 mg/dL Final     GFR Estimate 08/31/2021 77  >60 mL/min/1.73m2 Final    As of July 11, 2021, eGFR is calculated by the CKD-EPI creatinine equation, without race adjustment. eGFR can be influenced by muscle mass, exercise, and diet. The reported eGFR is an estimation only and is only applicable if the renal function is stable.     TSH 08/31/2021 4.34* 0.40 - 4.00 mU/L Final     Cholesterol 08/31/2021 310* <200 mg/dL Final    Age 0-19 years  Desirable: <170 mg/dL  Borderline high:  170-199 mg/dl  High:            >199 mg/dl    Age 20 years and older  Desirable: <200 mg/dL     Triglycerides 08/31/2021 151* <150 mg/dL Final    0-9 years:  Normal:    Less than 75 mg/dL  Borderline high:  75-99 mg/dL  High:             Greater than or equal to 100 mg/dL    0-19 years:  Normal:    Less than 90 mg/dL  Borderline high:   mg/dL  High:             Greater than or equal to 130 mg/dL    20 years and older:  Normal:    Less than 150 mg/dL  Borderline high:  150-199 mg/dL  High:             200-499 mg/dL  Very high:   Greater than or equal to 500 mg/dL     Direct Measure HDL 08/31/2021 70  >=40 mg/dL Final    0-19 years:       Greater than or equal to 45 mg/dL   Low: Less than 40 mg/dL    Borderline low: 40-44 mg/dL     20 years and older:   Female: Greater than or equal to 50 mg/dL   Male:   Greater than or equal to 40 mg/dL          LDL Cholesterol Calculated 08/31/2021 210* <=100 mg/dL Final    Age 0-19 years:  Desirable: 0-110 mg/dL   Borderline high: 110-129 mg/dL   High: >= 130 mg/dL    Age 20 years and older:  Desirable: <100mg/dL  Above desirable: 100-129 mg/dL   Borderline high: 130-159 mg/dL   High: 160-189 mg/dL   Very high: >= 190 mg/dL     Non HDL Cholesterol 08/31/2021 240* <130 mg/dL Final    0-19 years:  Desirable:          Less than 120 mg/dL  Borderline high:   120-144 mg/dL  High:                   Greater than or equal to 145 mg/dL    20 years and older:  Desirable:          130 mg/dL  Above Desirable: 130-159 mg/dL  Borderline high:   160-189 mg/dL  High:               190-219 mg/dL  Very high:     Greater than or equal to 220 mg/dL     Patient Fasting > 8hrs? 08/31/2021 Yes   Final     Prostate Specific Antigen Screen 08/31/2021 1.61  0.00 - 4.00 ug/L Final     WBC Count 08/31/2021 4.1  4.0 - 11.0 10e3/uL Final     RBC Count 08/31/2021 4.60  4.40 - 5.90 10e6/uL Final     Hemoglobin 08/31/2021 15.6  13.3 - 17.7 g/dL Final     Hematocrit 08/31/2021 42.2  40.0 - 53.0 % Final     MCV 08/31/2021 92  78 - 100 fL Final     MCH 08/31/2021 33.9* 26.5 - 33.0 pg Final     MCHC 08/31/2021 37.0* 31.5 - 36.5 g/dL Final     RDW 08/31/2021 12.0  10.0 - 15.0 % Final     Platelet Count 08/31/2021 215  150 - 450 10e3/uL Final     % Neutrophils 08/31/2021 50  % Final     % Lymphocytes 08/31/2021 37  % Final     % Monocytes 08/31/2021 11  % Final     % Eosinophils 08/31/2021 2  % Final     % Basophils 08/31/2021 1  % Final     Absolute Neutrophils 08/31/2021 2.0  1.6 - 8.3 10e3/uL Final     Absolute Lymphocytes 08/31/2021 1.5  0.8 - 5.3 10e3/uL Final     Absolute Monocytes 08/31/2021 0.5  0.0 - 1.3 10e3/uL Final     Absolute Eosinophils 08/31/2021 0.1  0.0 - 0.7 10e3/uL Final     Absolute  Basophils 08/31/2021 0.0  0.0 - 0.2 10e3/uL Final     Free T4 08/31/2021 0.97  0.76 - 1.46 ng/dL Final

## 2021-10-11 ENCOUNTER — HEALTH MAINTENANCE LETTER (OUTPATIENT)
Age: 61
End: 2021-10-11

## 2021-11-02 ENCOUNTER — NURSE TRIAGE (OUTPATIENT)
Dept: FAMILY MEDICINE | Facility: CLINIC | Age: 61
End: 2021-11-02

## 2021-11-02 NOTE — TELEPHONE ENCOUNTER
Patient also sent Serviot message regarding his need to talk to you about a short occurrence of loss of speech    Feeling ok now, but wants to know if he should see you sooner than later.    No triage needed    [phone]  Ok to leave message

## 2021-11-02 NOTE — TELEPHONE ENCOUNTER
Take baby ASA daily.  Will talk to patient tomorrow.  Morris Alexander MD on 11/2/2021 at 3:25 PM

## 2021-11-02 NOTE — TELEPHONE ENCOUNTER
TO PCP:     Called patient to triage below message    Pt states he had 1 brief episode that occurred a week ago in [place] - lasted about 60 seconds     Speech was garbled and pt recognized that - and maybe a little bit of confusion even though he was aware     Then was back to normal. Denies any additional symptoms     No personal hx of stroke or TIA but states there is a family history of this     Pt's son is a PA and daughter is an NP and they both advised discussing this with PCP     TO PCP:     Per protocol, would advise OV now, but no appointments available. Advised if ANY reoccurrence or new symptoms go directly to ED. Pt scheduled for a phone visit tomorrow and agreed to go to ED if any further concerns     Please advise if ok to do phone visit as scheduled tomorrow AM since symptoms were a week ago? Please advise     Reason for Disposition    Neurologic deficit that was brief (now gone), ANY of the following: * Weakness of the face, arm, or leg on one side of the body * Numbness of the face, arm, or leg on one side of the body * Loss of speech or garbled speech    Additional Information    Negative: Difficult to awaken or acting confused (e.g., disoriented, slurred speech)    Negative: New neurologic deficit that is present NOW, sudden onset of ANY of the following: * Weakness of the face, arm, or leg on one side of the body* Numbness of the face, arm, or leg on one side of the body* Loss of speech or garbled speech    Negative: Sounds like a life-threatening emergency to the triager    Negative: Confusion, disorientation, or hallucinations is the main symptom    Negative: Dizziness is the main symptom    Negative: Followed a head injury within last 3 days    Negative: Headache (with neurologic deficit)    Negative: Unable to urinate (or only a few drops) and bladder feels very full    Negative: Loss of control of bowel or bladder (i.e., incontinence) of new onset    Negative: Back pain with numbness  (loss of sensation) in groin or rectal area    Negative: Patient sounds very sick or weak to the triager    Protocols used: NEUROLOGIC DEFICIT-A-[place]

## 2021-11-03 ENCOUNTER — LAB (OUTPATIENT)
Dept: LAB | Facility: CLINIC | Age: 61
End: 2021-11-03

## 2021-11-03 ENCOUNTER — VIRTUAL VISIT (OUTPATIENT)
Dept: FAMILY MEDICINE | Facility: CLINIC | Age: 61
End: 2021-11-03
Payer: COMMERCIAL

## 2021-11-03 DIAGNOSIS — R47.89 SPELL OF CHANGE IN SPEECH: ICD-10-CM

## 2021-11-03 DIAGNOSIS — E78.5 HYPERLIPIDEMIA LDL GOAL <100: ICD-10-CM

## 2021-11-03 DIAGNOSIS — R47.89 SPELL OF CHANGE IN SPEECH: Primary | ICD-10-CM

## 2021-11-03 LAB
BASOPHILS # BLD AUTO: 0 10E3/UL (ref 0–0.2)
BASOPHILS NFR BLD AUTO: 1 %
EOSINOPHIL # BLD AUTO: 0.1 10E3/UL (ref 0–0.7)
EOSINOPHIL NFR BLD AUTO: 1 %
ERYTHROCYTE [DISTWIDTH] IN BLOOD BY AUTOMATED COUNT: 12.4 % (ref 10–15)
HCT VFR BLD AUTO: 44.4 % (ref 40–53)
HGB BLD-MCNC: 15.6 G/DL (ref 13.3–17.7)
LYMPHOCYTES # BLD AUTO: 1.8 10E3/UL (ref 0.8–5.3)
LYMPHOCYTES NFR BLD AUTO: 31 %
MCH RBC QN AUTO: 33.8 PG (ref 26.5–33)
MCHC RBC AUTO-ENTMCNC: 35.1 G/DL (ref 31.5–36.5)
MCV RBC AUTO: 96 FL (ref 78–100)
MONOCYTES # BLD AUTO: 0.5 10E3/UL (ref 0–1.3)
MONOCYTES NFR BLD AUTO: 9 %
NEUTROPHILS # BLD AUTO: 3.4 10E3/UL (ref 1.6–8.3)
NEUTROPHILS NFR BLD AUTO: 59 %
PLATELET # BLD AUTO: 199 10E3/UL (ref 150–450)
RBC # BLD AUTO: 4.62 10E6/UL (ref 4.4–5.9)
WBC # BLD AUTO: 5.8 10E3/UL (ref 4–11)

## 2021-11-03 PROCEDURE — 99213 OFFICE O/P EST LOW 20 MIN: CPT | Mod: 95 | Performed by: INTERNAL MEDICINE

## 2021-11-03 PROCEDURE — 36415 COLL VENOUS BLD VENIPUNCTURE: CPT

## 2021-11-03 PROCEDURE — 80050 GENERAL HEALTH PANEL: CPT

## 2021-11-03 RX ORDER — ROSUVASTATIN CALCIUM 10 MG/1
10 TABLET, COATED ORAL DAILY
Qty: 30 TABLET | Refills: 11 | Status: SHIPPED | OUTPATIENT
Start: 2021-11-03

## 2021-11-03 NOTE — PATIENT INSTRUCTIONS
Udo;  It is possible your symptoms relate to the utilization of scopolamine.  Nevertheless, we need to rule out a circulatory issue.    To this end, it is important to lower your cholesterol.  I will send in a medication called Crestor to lower your cholesterol and stabilize arterial plaque.    Continue to utilize your aspirin daily.    I have ordered an MRI and carotid Dopplers.  This will tell us if you have any issues with the small vessels of the brain or the large vessels supplying the brain.     Additionally, I would like to obtain lab tests to make certain that your sodium and thyroid levels are reasonable.    Best regards  Morris Alexander MD on 11/3/2021 at 8:53 AM

## 2021-11-03 NOTE — PROGRESS NOTES
Ambrosio is a 61 year old who is being evaluated via a billable telephone visit.      What phone number would you like to be contacted at? [phone]  How would you like to obtain your AVS? MyChart    Assessment & Plan     Spell of change in speech  May relate to scopolamine utilization in the setting of mild dehydration.  Obviously need to rule out the possibility of TIA or metabolic disturbance.    Patient denies any symptoms consistent with an arrhythmia.  No change in exercise tolerance.  We will reserve Holter monitor and cardiac echo.    Aspirin 81 mg daily and Crestor 10 mg daily initiated.    - MR Brain w/o & w Contrast; Future  - US Carotid Bilateral; Future  - TSH with free T4 reflex; Future  - Comprehensive metabolic panel (BMP + Alb, Alk Phos, ALT, AST, Total. Bili, TP); Future  - CBC with platelets and differential; Future    Hyperlipidemia LDL goal <100  Initiate Crestor 10 mg.  Patient to continue aspirin additionally.  - rosuvastatin (CRESTOR) 10 MG tablet; Take 1 tablet (10 mg) by mouth daily       See Patient Instructions    Return in about 1 week (around 11/10/2021) for Follow up.    Morris Alexander MD  [facility]    Subjective   Ambrosio is a 61 year old who presents for the following health issues     HPI as noted.  Symptoms lasted roughly 60 seconds.  He did have confusion in conjunction with this.  Had utilize scopolamine prior to sailing.  Episode happened while on the water.  No alcohol associated with the episode.  Does confirm perhaps elements of dehydration.    No residual symptoms.  No chest pain or palpitations.  No tachycardia.  No shortness of breath.  No falloff in exertional tolerance.    Risk factors include borderline hypertension, hypercholesterolemia.    Speech Problem    Pt states he had 1 brief episode that occurred a week ago in [place] - lasted about 60 seconds.      Speech was garbled and pt recognized that - and maybe a little bit of confusion even though  he was aware.      Then was back to normal. Denies any additional symptoms.      No personal hx of stroke or TIA but states there is a family history of this.          Review of Systems   Constitutional, HEENT, cardiovascular, pulmonary, gi and gu systems are negative, except as otherwise noted.      Objective           Vitals:  No vitals were obtained today due to virtual visit.    Physical Exam   healthy, alert and no distress  PSYCH: Alert and oriented times 3; coherent speech, normal   rate and volume, able to articulate logical thoughts, able   to abstract reason, no tangential thoughts, no hallucinations   or delusions  His affect is normal  RESP: No cough, no audible wheezing, able to talk in full sentences  Remainder of exam unable to be completed due to telephone visits    Office Visit on 08/31/2021   Component Date Value Ref Range Status     Sodium 08/31/2021 131* 133 - 144 mmol/L Final     Potassium 08/31/2021 4.4  3.4 - 5.3 mmol/L Final     Chloride 08/31/2021 102  94 - 109 mmol/L Final     Carbon Dioxide (CO2) 08/31/2021 26  20 - 32 mmol/L Final     Anion Gap 08/31/2021 3  3 - 14 mmol/L Final     Urea Nitrogen 08/31/2021 8  7 - 30 mg/dL Final     Creatinine 08/31/2021 1.04  0.66 - 1.25 mg/dL Final     Calcium 08/31/2021 9.1  8.5 - 10.1 mg/dL Final     Glucose 08/31/2021 95  70 - 99 mg/dL Final     Alkaline Phosphatase 08/31/2021 71  40 - 150 U/L Final     AST 08/31/2021 28  0 - 45 U/L Final     ALT 08/31/2021 50  0 - 70 U/L Final     Protein Total 08/31/2021 8.0  6.8 - 8.8 g/dL Final     Albumin 08/31/2021 4.0  3.4 - 5.0 g/dL Final     Bilirubin Total 08/31/2021 0.5  0.2 - 1.3 mg/dL Final     GFR Estimate 08/31/2021 77  >60 mL/min/1.73m2 Final    As of July 11, 2021, eGFR is calculated by the CKD-EPI creatinine equation, without race adjustment. eGFR can be influenced by muscle mass, exercise, and diet. The reported eGFR is an estimation only and is only applicable if the renal function is stable.      TSH 08/31/2021 4.34* 0.40 - 4.00 mU/L Final     Cholesterol 08/31/2021 310* <200 mg/dL Final    Age 0-19 years  Desirable: <170 mg/dL  Borderline high:  170-199 mg/dl  High:            >199 mg/dl    Age 20 years and older  Desirable: <200 mg/dL     Triglycerides 08/31/2021 151* <150 mg/dL Final    0-9 years:  Normal:    Less than 75 mg/dL  Borderline high:  75-99 mg/dL  High:             Greater than or equal to 100 mg/dL    0-19 years:  Normal:    Less than 90 mg/dL  Borderline high:   mg/dL  High:             Greater than or equal to 130 mg/dL    20 years and older:  Normal:    Less than 150 mg/dL  Borderline high:  150-199 mg/dL  High:             200-499 mg/dL  Very high:   Greater than or equal to 500 mg/dL     Direct Measure HDL 08/31/2021 70  >=40 mg/dL Final    0-19 years:       Greater than or equal to 45 mg/dL   Low: Less than 40 mg/dL   Borderline low: 40-44 mg/dL     20 years and older:   Female: Greater than or equal to 50 mg/dL   Male:   Greater than or equal to 40 mg/dL          LDL Cholesterol Calculated 08/31/2021 210* <=100 mg/dL Final    Age 0-19 years:  Desirable: 0-110 mg/dL   Borderline high: 110-129 mg/dL   High: >= 130 mg/dL    Age 20 years and older:  Desirable: <100mg/dL  Above desirable: 100-129 mg/dL   Borderline high: 130-159 mg/dL   High: 160-189 mg/dL   Very high: >= 190 mg/dL     Non HDL Cholesterol 08/31/2021 240* <130 mg/dL Final    0-19 years:  Desirable:          Less than 120 mg/dL  Borderline high:   120-144 mg/dL  High:                   Greater than or equal to 145 mg/dL    20 years and older:  Desirable:          130 mg/dL  Above Desirable: 130-159 mg/dL  Borderline high:   160-189 mg/dL  High:               190-219 mg/dL  Very high:     Greater than or equal to 220 mg/dL     Patient Fasting > 8hrs? 08/31/2021 Yes   Final     Prostate Specific Antigen Screen 08/31/2021 1.61  0.00 - 4.00 ug/L Final     WBC Count 08/31/2021 4.1  4.0 - 11.0 10e3/uL Final     RBC Count  08/31/2021 4.60  4.40 - 5.90 10e6/uL Final     Hemoglobin 08/31/2021 15.6  13.3 - 17.7 g/dL Final     Hematocrit 08/31/2021 42.2  40.0 - 53.0 % Final     MCV 08/31/2021 92  78 - 100 fL Final     MCH 08/31/2021 33.9* 26.5 - 33.0 pg Final     MCHC 08/31/2021 37.0* 31.5 - 36.5 g/dL Final     RDW 08/31/2021 12.0  10.0 - 15.0 % Final     Platelet Count 08/31/2021 215  150 - 450 10e3/uL Final     % Neutrophils 08/31/2021 50  % Final     % Lymphocytes 08/31/2021 37  % Final     % Monocytes 08/31/2021 11  % Final     % Eosinophils 08/31/2021 2  % Final     % Basophils 08/31/2021 1  % Final     Absolute Neutrophils 08/31/2021 2.0  1.6 - 8.3 10e3/uL Final     Absolute Lymphocytes 08/31/2021 1.5  0.8 - 5.3 10e3/uL Final     Absolute Monocytes 08/31/2021 0.5  0.0 - 1.3 10e3/uL Final     Absolute Eosinophils 08/31/2021 0.1  0.0 - 0.7 10e3/uL Final     Absolute Basophils 08/31/2021 0.0  0.0 - 0.2 10e3/uL Final     Free T4 08/31/2021 0.97  0.76 - 1.46 ng/dL Final               Phone call duration: 10 minutes

## 2021-11-04 ENCOUNTER — HOSPITAL ENCOUNTER (OUTPATIENT)
Dept: ULTRASOUND IMAGING | Facility: CLINIC | Age: 61
End: 2021-11-04
Attending: INTERNAL MEDICINE
Payer: COMMERCIAL

## 2021-11-04 ENCOUNTER — HOSPITAL ENCOUNTER (OUTPATIENT)
Dept: MRI IMAGING | Facility: CLINIC | Age: 61
End: 2021-11-04
Attending: INTERNAL MEDICINE
Payer: COMMERCIAL

## 2021-11-04 DIAGNOSIS — R47.89 SPELL OF CHANGE IN SPEECH: ICD-10-CM

## 2021-11-04 LAB
ALBUMIN SERPL-MCNC: 3.6 G/DL (ref 3.4–5)
ALP SERPL-CCNC: 58 U/L (ref 40–150)
ALT SERPL W P-5'-P-CCNC: 38 U/L (ref 0–70)
ANION GAP SERPL CALCULATED.3IONS-SCNC: 8 MMOL/L (ref 3–14)
AST SERPL W P-5'-P-CCNC: 19 U/L (ref 0–45)
BILIRUB SERPL-MCNC: 0.5 MG/DL (ref 0.2–1.3)
BUN SERPL-MCNC: 16 MG/DL (ref 7–30)
CALCIUM SERPL-MCNC: 8.5 MG/DL (ref 8.5–10.1)
CHLORIDE BLD-SCNC: 104 MMOL/L (ref 94–109)
CO2 SERPL-SCNC: 22 MMOL/L (ref 20–32)
CREAT SERPL-MCNC: 1.15 MG/DL (ref 0.66–1.25)
GFR SERPL CREATININE-BSD FRML MDRD: 68 ML/MIN/1.73M2
GLUCOSE BLD-MCNC: 87 MG/DL (ref 70–99)
POTASSIUM BLD-SCNC: 4.7 MMOL/L (ref 3.4–5.3)
PROT SERPL-MCNC: 7.4 G/DL (ref 6.8–8.8)
SODIUM SERPL-SCNC: 134 MMOL/L (ref 133–144)
TSH SERPL DL<=0.005 MIU/L-ACNC: 1.36 MU/L (ref 0.4–4)

## 2021-11-04 PROCEDURE — 70553 MRI BRAIN STEM W/O & W/DYE: CPT

## 2021-11-04 PROCEDURE — 255N000002 HC RX 255 OP 636: Performed by: INTERNAL MEDICINE

## 2021-11-04 PROCEDURE — A9585 GADOBUTROL INJECTION: HCPCS | Performed by: INTERNAL MEDICINE

## 2021-11-04 PROCEDURE — 93880 EXTRACRANIAL BILAT STUDY: CPT

## 2021-11-04 RX ORDER — GADOBUTROL 604.72 MG/ML
10 INJECTION INTRAVENOUS ONCE
Status: COMPLETED | OUTPATIENT
Start: 2021-11-04 | End: 2021-11-04

## 2021-11-04 RX ADMIN — GADOBUTROL 9 ML: 604.72 INJECTION INTRAVENOUS at 08:01

## 2021-11-05 ENCOUNTER — TELEPHONE (OUTPATIENT)
Dept: FAMILY MEDICINE | Facility: CLINIC | Age: 61
End: 2021-11-05
Payer: COMMERCIAL

## 2021-11-05 DIAGNOSIS — R47.89 SPELL OF CHANGE IN SPEECH: Primary | ICD-10-CM

## 2021-11-05 NOTE — TELEPHONE ENCOUNTER
----- Message from Morris Alexander MD sent at 11/5/2021  8:15 AM CDT -----  Team - please help patient with scheduling of TTE and Zio patch.  Morris Alexander MD on 11/5/2021 at 8:15 AM

## 2021-11-05 NOTE — TELEPHONE ENCOUNTER
Left message for patient to call back to clinic to get number to schedule echocardiogram.    Cristian Gray, CMA on 11/5/2021 at 2:51 PM

## 2021-11-06 DIAGNOSIS — N52.9 ERECTILE DYSFUNCTION, UNSPECIFIED ERECTILE DYSFUNCTION TYPE: ICD-10-CM

## 2021-11-08 DIAGNOSIS — N52.9 ERECTILE DYSFUNCTION, UNSPECIFIED ERECTILE DYSFUNCTION TYPE: ICD-10-CM

## 2021-11-08 RX ORDER — SILDENAFIL CITRATE 20 MG/1
TABLET ORAL
Qty: 10 TABLET | Refills: 2 | Status: SHIPPED | OUTPATIENT
Start: 2021-11-08 | End: 2022-04-01

## 2021-11-08 RX ORDER — SILDENAFIL CITRATE 20 MG/1
TABLET ORAL
Qty: 10 TABLET | Refills: 2 | OUTPATIENT
Start: 2021-11-08

## 2021-11-08 NOTE — TELEPHONE ENCOUNTER
This refill request is a duplicate request, previously received or sent.  Sent denial notification to pharmacy.  Kelley Moran, Triage RN  [facility]

## 2021-11-08 NOTE — TELEPHONE ENCOUNTER
Patient returned call, gave him cardiology scheduling number     Nicole VALERA, Triage RN  [facility]

## 2021-11-10 ENCOUNTER — OFFICE VISIT (OUTPATIENT)
Dept: FAMILY MEDICINE | Facility: CLINIC | Age: 61
End: 2021-11-10
Payer: COMMERCIAL

## 2021-11-10 VITALS
RESPIRATION RATE: 16 BRPM | SYSTOLIC BLOOD PRESSURE: 130 MMHG | TEMPERATURE: 97 F | OXYGEN SATURATION: 97 % | HEART RATE: 68 BPM | HEIGHT: 69 IN | DIASTOLIC BLOOD PRESSURE: 87 MMHG | WEIGHT: 202.1 LBS | BODY MASS INDEX: 29.93 KG/M2

## 2021-11-10 DIAGNOSIS — R47.89 SPELL OF CHANGE IN SPEECH: Primary | ICD-10-CM

## 2021-11-10 PROCEDURE — 99213 OFFICE O/P EST LOW 20 MIN: CPT | Performed by: INTERNAL MEDICINE

## 2021-11-10 ASSESSMENT — MIFFLIN-ST. JEOR: SCORE: 1707.1

## 2021-11-10 NOTE — PROGRESS NOTES
"    Assessment & Plan     Spell of change in speech  Likely related to scopolamine.  Currently doing well on his medications.  Taking the Crestor regularly.    Holter monitor, and transthoracic echo currently pending.  Remainder of work-up essentially has been negative.    Will return to clinic in 2 months for cholesterol, blood pressure, and ALT evaluation.         See Patient Instructions    Return in about 2 months (around 1/10/2022) for Follow up.    Morris Alexander MD  [facility] gonzales is a 61 year old who presents for the following health issues     HPI 61-year-old male presenting to clinic today for follow-up.  He had an episode of speech abnormality and confusion which [place] perhaps [place]  He had taken scopolamine to avoid seasickness.  The episode happened in this setting.    So far the work-up has been negative.  His carotids have been fine.  The MRI was negative.  Currently pending are the transthoracic echo and the Holter monitor.    He denies palpitations or tachycardia.  No lower extremity edema.    Compliant with medications inclusive of a baby aspirin and Crestor.        Review of Systems   Constitutional, HEENT, cardiovascular, pulmonary, gi and gu systems are negative, except as otherwise noted.      Objective    /87 (BP Location: Left arm, Cuff Size: Adult Large)   Pulse 68   Temp 97  F (36.1  C) (Tympanic)   Resp 16   Ht 1.753 m (5' 9\")   Wt 91.7 kg (202 lb 1.6 oz)   SpO2 97%   BMI 29.84 kg/m    Body mass index is 29.84 kg/m .  Physical Exam   Reflexes symmetric strength is symmetric no tremulousness    Heart regular rate and rhythm auscultated for 1 minute  Lung fields clear without wheezes rales or rhonchi  Lower extremities no edema.    Lab on 11/03/2021   Component Date Value Ref Range Status     TSH 11/03/2021 1.36  0.40 - 4.00 mU/L Final     Sodium 11/03/2021 134  133 - 144 mmol/L Final     Potassium " 11/03/2021 4.7  3.4 - 5.3 mmol/L Final     Chloride 11/03/2021 104  94 - 109 mmol/L Final     Carbon Dioxide (CO2) 11/03/2021 22  20 - 32 mmol/L Final     Anion Gap 11/03/2021 8  3 - 14 mmol/L Final     Urea Nitrogen 11/03/2021 16  7 - 30 mg/dL Final     Creatinine 11/03/2021 1.15  0.66 - 1.25 mg/dL Final     Calcium 11/03/2021 8.5  8.5 - 10.1 mg/dL Final     Glucose 11/03/2021 87  70 - 99 mg/dL Final     Alkaline Phosphatase 11/03/2021 58  40 - 150 U/L Final     AST 11/03/2021 19  0 - 45 U/L Final     ALT 11/03/2021 38  0 - 70 U/L Final     Protein Total 11/03/2021 7.4  6.8 - 8.8 g/dL Final     Albumin 11/03/2021 3.6  3.4 - 5.0 g/dL Final     Bilirubin Total 11/03/2021 0.5  0.2 - 1.3 mg/dL Final     GFR Estimate 11/03/2021 68  >60 mL/min/1.73m2 Final    As of July 11, 2021, eGFR is calculated by the CKD-EPI creatinine equation, without race adjustment. eGFR can be influenced by muscle mass, exercise, and diet. The reported eGFR is an estimation only and is only applicable if the renal function is stable.     WBC Count 11/03/2021 5.8  4.0 - 11.0 10e3/uL Final     RBC Count 11/03/2021 4.62  4.40 - 5.90 10e6/uL Final     Hemoglobin 11/03/2021 15.6  13.3 - 17.7 g/dL Final     Hematocrit 11/03/2021 44.4  40.0 - 53.0 % Final     MCV 11/03/2021 96  78 - 100 fL Final     MCH 11/03/2021 33.8* 26.5 - 33.0 pg Final     MCHC 11/03/2021 35.1  31.5 - 36.5 g/dL Final     RDW 11/03/2021 12.4  10.0 - 15.0 % Final     Platelet Count 11/03/2021 199  150 - 450 10e3/uL Final     % Neutrophils 11/03/2021 59  % Final     % Lymphocytes 11/03/2021 31  % Final     % Monocytes 11/03/2021 9  % Final     % Eosinophils 11/03/2021 1  % Final     % Basophils 11/03/2021 1  % Final     Absolute Neutrophils 11/03/2021 3.4  1.6 - 8.3 10e3/uL Final     Absolute Lymphocytes 11/03/2021 1.8  0.8 - 5.3 10e3/uL Final     Absolute Monocytes 11/03/2021 0.5  0.0 - 1.3 10e3/uL Final     Absolute Eosinophils 11/03/2021 0.1  0.0 - 0.7 10e3/uL Final     Absolute  Basophils 11/03/2021 0.0  0.0 - 0.2 10e3/uL Final

## 2021-11-16 ENCOUNTER — HOSPITAL ENCOUNTER (OUTPATIENT)
Dept: CARDIOLOGY | Facility: CLINIC | Age: 61
Discharge: HOME | End: 2021-11-16
Attending: INTERNAL MEDICINE | Admitting: INTERNAL MEDICINE
Payer: COMMERCIAL

## 2021-11-16 DIAGNOSIS — R47.89 SPELL OF CHANGE IN SPEECH: ICD-10-CM

## 2021-11-16 PROCEDURE — 93244 EXT ECG>48HR<7D REV&INTERPJ: CPT | Performed by: INTERNAL MEDICINE

## 2021-11-16 PROCEDURE — 93242 EXT ECG>48HR<7D RECORDING: CPT

## 2021-11-30 ENCOUNTER — HOSPITAL ENCOUNTER (OUTPATIENT)
Dept: CARDIOLOGY | Facility: CLINIC | Age: 61
Discharge: HOME | End: 2021-11-30
Attending: INTERNAL MEDICINE | Admitting: INTERNAL MEDICINE
Payer: COMMERCIAL

## 2021-11-30 DIAGNOSIS — R47.89 SPELL OF CHANGE IN SPEECH: ICD-10-CM

## 2021-11-30 LAB — LVEF ECHO: NORMAL

## 2021-11-30 PROCEDURE — 93306 TTE W/DOPPLER COMPLETE: CPT | Mod: 26 | Performed by: INTERNAL MEDICINE

## 2021-11-30 PROCEDURE — 93306 TTE W/DOPPLER COMPLETE: CPT

## 2022-01-19 ENCOUNTER — OFFICE VISIT (OUTPATIENT)
Dept: FAMILY MEDICINE | Facility: CLINIC | Age: 62
End: 2022-01-19
Payer: COMMERCIAL

## 2022-01-19 VITALS
DIASTOLIC BLOOD PRESSURE: 95 MMHG | SYSTOLIC BLOOD PRESSURE: 142 MMHG | TEMPERATURE: 97.7 F | HEART RATE: 61 BPM | RESPIRATION RATE: 16 BRPM | BODY MASS INDEX: 31.25 KG/M2 | HEIGHT: 69 IN | WEIGHT: 211 LBS | OXYGEN SATURATION: 99 %

## 2022-01-19 DIAGNOSIS — E03.9 HYPOTHYROIDISM, UNSPECIFIED TYPE: ICD-10-CM

## 2022-01-19 DIAGNOSIS — E78.5 HYPERLIPIDEMIA LDL GOAL <130: Primary | ICD-10-CM

## 2022-01-19 DIAGNOSIS — R03.0 ELEVATED BLOOD PRESSURE READING WITHOUT DIAGNOSIS OF HYPERTENSION: ICD-10-CM

## 2022-01-19 LAB
ALT SERPL W P-5'-P-CCNC: 59 U/L (ref 0–70)
CHOLEST SERPL-MCNC: 209 MG/DL
CK SERPL-CCNC: 89 U/L (ref 30–300)
FASTING STATUS PATIENT QL REPORTED: NO
HDLC SERPL-MCNC: 63 MG/DL
LDLC SERPL CALC-MCNC: 120 MG/DL
NONHDLC SERPL-MCNC: 146 MG/DL
TRIGL SERPL-MCNC: 131 MG/DL
TSH SERPL DL<=0.005 MIU/L-ACNC: 1.74 MU/L (ref 0.4–4)

## 2022-01-19 PROCEDURE — 84460 ALANINE AMINO (ALT) (SGPT): CPT | Performed by: INTERNAL MEDICINE

## 2022-01-19 PROCEDURE — 99214 OFFICE O/P EST MOD 30 MIN: CPT | Performed by: INTERNAL MEDICINE

## 2022-01-19 PROCEDURE — 80061 LIPID PANEL: CPT | Performed by: INTERNAL MEDICINE

## 2022-01-19 PROCEDURE — 82550 ASSAY OF CK (CPK): CPT | Performed by: INTERNAL MEDICINE

## 2022-01-19 PROCEDURE — 84443 ASSAY THYROID STIM HORMONE: CPT | Performed by: INTERNAL MEDICINE

## 2022-01-19 PROCEDURE — 36415 COLL VENOUS BLD VENIPUNCTURE: CPT | Performed by: INTERNAL MEDICINE

## 2022-01-19 ASSESSMENT — MIFFLIN-ST. JEOR: SCORE: 1747.47

## 2022-01-19 ASSESSMENT — PAIN SCALES - GENERAL: PAINLEVEL: NO PAIN (0)

## 2022-01-19 NOTE — PROGRESS NOTES
Assessment & Plan     Hyperlipidemia LDL goal <130  Recently started Crestor, will check cholesterol levels. Side effect profile discussed.  - Lipid panel reflex to direct LDL Fasting; Future  - ALT; Future  - CK total; Future  - Lipid panel reflex to direct LDL Fasting  - ALT  - CK total    Hypothyroidism, unspecified type  Patient would like to discontine. More bowel issues since starting. No significant improvement in energy or mentation.  - TSH with free T4 reflex; Future  - TSH with free T4 reflex    Elevated blood pressure reading without diagnosis of hypertension  Will monitor. Will start Losartan if elevated at home.         See Patient Instructions    Return in about 7 months (around 8/19/2022) for Routine preventive.    Morris Alexander MD  [facility] Valente is a 61 year old who presents for the following health issues     HPI 61 year old presents to clinic for follow up. Recent spell likely related to dehydration/scopolamine. Resolved. Work up negative.    Since starting synthroid, more issues with bowel, diarrhea, loose stools, pain.    Discussed evaluating cholesterol levels, and monitoring blood pressure     Follow up          Review of Systems         Objective    There were no vitals taken for this visit.  There is no height or weight on file to calculate BMI.  Physical Exam

## 2022-01-19 NOTE — PATIENT INSTRUCTIONS
Udo;  Today I would like to check your cholesterol in a couple of blood tests to make certain that the medication is not causing any irritation of your liver or muscles.  Rarely Crestor can lead to diarrhea as well.    Your thyroid will be assessed.  If your levels are high normal given your current symptoms will discontinue the use of supplementation.    It is very important that you start monitoring your blood pressure.  Blood pressure readings should be less than 135/85.  If you are at home levels are higher than this, a medication called losartan can be very helpful in reducing cardiovascular risk.    Best regards  Morris

## 2022-03-31 DIAGNOSIS — N52.9 ERECTILE DYSFUNCTION, UNSPECIFIED ERECTILE DYSFUNCTION TYPE: ICD-10-CM

## 2022-04-01 RX ORDER — SILDENAFIL CITRATE 20 MG/1
TABLET ORAL
Qty: 10 TABLET | Refills: 2 | Status: SHIPPED | OUTPATIENT
Start: 2022-04-01 | End: 2022-07-26

## 2022-04-01 NOTE — TELEPHONE ENCOUNTER
Prescription approved per [facility] Refill Protocol.    Linda SHARMA RN  EP Triage

## 2022-07-22 DIAGNOSIS — N52.9 ERECTILE DYSFUNCTION, UNSPECIFIED ERECTILE DYSFUNCTION TYPE: ICD-10-CM

## 2022-07-26 RX ORDER — SILDENAFIL CITRATE 20 MG/1
TABLET ORAL
Qty: 10 TABLET | Refills: 2 | Status: SHIPPED | OUTPATIENT
Start: 2022-07-26

## 2022-09-24 ENCOUNTER — HEALTH MAINTENANCE LETTER (OUTPATIENT)
Age: 62
End: 2022-09-24

## 2023-01-29 ENCOUNTER — HEALTH MAINTENANCE LETTER (OUTPATIENT)
Age: 63
End: 2023-01-29

## 2024-02-25 ENCOUNTER — HEALTH MAINTENANCE LETTER (OUTPATIENT)
Age: 64
End: 2024-02-25

## 2024-07-14 ENCOUNTER — HEALTH MAINTENANCE LETTER (OUTPATIENT)
Age: 64
End: 2024-07-14